# Patient Record
Sex: MALE | Race: WHITE | Employment: OTHER | URBAN - METROPOLITAN AREA
[De-identification: names, ages, dates, MRNs, and addresses within clinical notes are randomized per-mention and may not be internally consistent; named-entity substitution may affect disease eponyms.]

---

## 2018-04-22 ENCOUNTER — HOSPITAL ENCOUNTER (OUTPATIENT)
Age: 58
Discharge: HOME | End: 2018-04-22
Attending: EMERGENCY MEDICINE
Payer: COMMERCIAL

## 2018-04-22 VITALS
TEMPERATURE: 99 F | SYSTOLIC BLOOD PRESSURE: 158 MMHG | RESPIRATION RATE: 18 BRPM | DIASTOLIC BLOOD PRESSURE: 82 MMHG | HEART RATE: 97 BPM | WEIGHT: 260 LBS | OXYGEN SATURATION: 98 %

## 2018-04-22 DIAGNOSIS — L03.114 CELLULITIS OF LEFT UPPER EXTREMITY: Primary | ICD-10-CM

## 2018-04-22 PROCEDURE — 99203 OFFICE O/P NEW LOW 30 MIN: CPT

## 2018-04-22 PROCEDURE — 99204 OFFICE O/P NEW MOD 45 MIN: CPT

## 2018-04-22 RX ORDER — CLINDAMYCIN HYDROCHLORIDE 300 MG/1
300 CAPSULE ORAL 3 TIMES DAILY
Qty: 30 CAPSULE | Refills: 0 | Status: SHIPPED | OUTPATIENT
Start: 2018-04-22 | End: 2018-04-25 | Stop reason: CLARIF

## 2018-04-22 NOTE — ED PROVIDER NOTES
Patient Seen in: [address]    History   Patient presents with:  Cellulitis (integumentary, infectious)    Stated Complaint: left hand swalling/rash    HPI    The patient is a 18-year-old male with no significant past me patient's motor strength is 5 out of 5 and symmetric in the upper and lower extremities bilaterally  Extremities: No focal swelling or tenderness.   Normal range of motion of the hand and wrist  Skin: There is mild redness to the dorsal aspect of the left h

## 2018-04-22 NOTE — ED INITIAL ASSESSMENT (HPI)
Left hand reddened, edematous. Pain in left hand. Possibly hand injury but pt doesn't remember. Increased redness since this morning. Symptoms since Wednesday.  Pt denies fever, chills

## 2018-04-23 NOTE — PROGRESS NOTES
Please call patient to offer a Urgent Care follow up visit, so they can establish with Dr. Mikey Mock or I for their primary care needs. Thank you!

## 2018-04-24 ENCOUNTER — TELEPHONE (OUTPATIENT)
Dept: FAMILY MEDICINE CLINIC | Facility: CLINIC | Age: 58
End: 2018-04-24

## 2018-04-24 NOTE — TELEPHONE ENCOUNTER
Pt was seen in UC for cellulitis and is taking Clindamycin 300mg tid. Pt is visiting family from out of town and leaving on Friday, 4/27/18. Scheduled pt for 4/25/18 for f/u.

## 2018-04-25 ENCOUNTER — LAB ENCOUNTER (OUTPATIENT)
Dept: LAB | Facility: REFERENCE LAB | Age: 58
End: 2018-04-25
Attending: FAMILY MEDICINE
Payer: COMMERCIAL

## 2018-04-25 ENCOUNTER — OFFICE VISIT (OUTPATIENT)
Dept: FAMILY MEDICINE CLINIC | Facility: CLINIC | Age: 58
End: 2018-04-25

## 2018-04-25 ENCOUNTER — HOSPITAL ENCOUNTER (OUTPATIENT)
Dept: GENERAL RADIOLOGY | Age: 58
Discharge: HOME | End: 2018-04-25
Attending: FAMILY MEDICINE
Payer: COMMERCIAL

## 2018-04-25 VITALS
DIASTOLIC BLOOD PRESSURE: 82 MMHG | BODY MASS INDEX: 37.91 KG/M2 | HEIGHT: 73 IN | SYSTOLIC BLOOD PRESSURE: 136 MMHG | HEART RATE: 88 BPM | WEIGHT: 286 LBS | OXYGEN SATURATION: 98 %

## 2018-04-25 DIAGNOSIS — M79.89 SWELLING OF LEFT HAND: ICD-10-CM

## 2018-04-25 DIAGNOSIS — L03.114 CELLULITIS OF LEFT HAND: Primary | ICD-10-CM

## 2018-04-25 DIAGNOSIS — Z23 NEED FOR VACCINATION: ICD-10-CM

## 2018-04-25 PROCEDURE — 85025 COMPLETE CBC W/AUTO DIFF WBC: CPT | Performed by: FAMILY MEDICINE

## 2018-04-25 PROCEDURE — 73130 X-RAY EXAM OF HAND: CPT | Performed by: FAMILY MEDICINE

## 2018-04-25 PROCEDURE — 85652 RBC SED RATE AUTOMATED: CPT | Performed by: FAMILY MEDICINE

## 2018-04-25 PROCEDURE — 36415 COLL VENOUS BLD VENIPUNCTURE: CPT | Performed by: FAMILY MEDICINE

## 2018-04-25 PROCEDURE — 90471 IMMUNIZATION ADMIN: CPT | Performed by: FAMILY MEDICINE

## 2018-04-25 PROCEDURE — 86141 C-REACTIVE PROTEIN HS: CPT | Performed by: FAMILY MEDICINE

## 2018-04-25 PROCEDURE — 90715 TDAP VACCINE 7 YRS/> IM: CPT | Performed by: FAMILY MEDICINE

## 2018-04-25 PROCEDURE — 99202 OFFICE O/P NEW SF 15 MIN: CPT | Performed by: FAMILY MEDICINE

## 2018-04-25 RX ORDER — CEFADROXIL 1000 MG/1
1 TABLET ORAL 2 TIMES DAILY
Qty: 20 TABLET | Refills: 0 | Status: SHIPPED | OUTPATIENT
Start: 2018-04-25 | End: 2018-05-05

## 2018-04-25 RX ORDER — SULFAMETHOXAZOLE AND TRIMETHOPRIM 800; 160 MG/1; MG/1
1 TABLET ORAL 2 TIMES DAILY
Qty: 20 TABLET | Refills: 0 | Status: SHIPPED | OUTPATIENT
Start: 2018-04-25 | End: 2018-05-05

## 2018-04-25 NOTE — PROGRESS NOTES
CC:  Patient presents with: Follow - Up: left hand cellulitis, redness and swelling is spreading to his fingers and up his arm      HPI: 60year old male here with left hand cellulitis that is worsening.   Visiting his daughter from [place] and report Pulse: 88    SpO2: 98%    Weight: 286 lb    Height: 73\"        Body mass index is 37.73 kg/m².     Physical:  General:  Alert, appropriate, no acute distress   HEENT: supple, no tonsillar erythema or exudate, no lymphadenopathy   Cardio:  RRR, no murmurs cellulitis marked and patient to monitor closely for worsening  - Warning signs and ED precautions reviewed and he is to follow-up with PCP once he returns home or sooner if needed     2.  Swelling of left hand    - X-ray negative for acute osteomyelitis an

## 2018-04-25 NOTE — PATIENT INSTRUCTIONS
Discharge Instructions for Cellulitis  You have been diagnosed with cellulitis. This is an infection in the deepest layer of the skin. In some cases, the infection also affects the muscle. Cellulitis is caused by bacteria.  The bacteria can enter the body Date Last Reviewed: 8/1/2016  © 2786-8567 [employer] [address] [facility], [facility] [place]. All rights reserved. This information is not intended as a substitute for professional medical care.  Always follow your healthcare professional'

## 2018-04-27 ENCOUNTER — TELEPHONE (OUTPATIENT)
Dept: FAMILY MEDICINE CLINIC | Facility: CLINIC | Age: 58
End: 2018-04-27

## 2018-04-27 NOTE — TELEPHONE ENCOUNTER
Per pt, the Cefadroxil 1 gm bid that was sent in to [facility] was only a temporary fill. The pharmacy only had 6 tabs and pt needed 20 for a 10 day period.  Pt is leaving to go back home and is requesting that we call in the remaining tabs to his local pharmacy [facility]

## 2018-07-17 ENCOUNTER — HOSPITAL ENCOUNTER (OUTPATIENT)
Age: 58
Discharge: HOME | End: 2018-07-17
Attending: FAMILY MEDICINE
Payer: COMMERCIAL

## 2018-07-17 VITALS
RESPIRATION RATE: 22 BRPM | OXYGEN SATURATION: 100 % | HEIGHT: 72 IN | HEART RATE: 92 BPM | BODY MASS INDEX: 37.52 KG/M2 | WEIGHT: 277 LBS | SYSTOLIC BLOOD PRESSURE: 143 MMHG | DIASTOLIC BLOOD PRESSURE: 76 MMHG | TEMPERATURE: 98 F

## 2018-07-17 DIAGNOSIS — M20.012 MALLET FINGER OF LEFT HAND: Primary | ICD-10-CM

## 2018-07-17 PROCEDURE — 99213 OFFICE O/P EST LOW 20 MIN: CPT

## 2018-07-17 PROCEDURE — 99212 OFFICE O/P EST SF 10 MIN: CPT

## 2018-07-17 NOTE — ED PROVIDER NOTES
Patient Seen in: [address]    History   Patient presents with:  Upper Extremity Injury (musculoskeletal)    Stated Complaint: Left Middle Finger Injury     HPI  24-year-old male presents to IC after jamming his finger w discrimination, normal capillary refill, no deformity, no laceration and no swelling. Normal sensation noted. Normal strength noted. Classic mallet deformity of left middle finger DIP without bruising, swelling or erythema.  Sensation and cap refill in ta

## 2018-07-17 NOTE — ED NOTES
Patient discharged home. Follow up instructions explained to patient. Patient verbalized understanding and agreed.

## 2018-07-17 NOTE — ED INITIAL ASSESSMENT (HPI)
Per pt c/o left middle finger injury noted today. He reports mild pain. Denied use of medication for pain.